# Patient Record
Sex: MALE | Race: WHITE | NOT HISPANIC OR LATINO | URBAN - METROPOLITAN AREA
[De-identification: names, ages, dates, MRNs, and addresses within clinical notes are randomized per-mention and may not be internally consistent; named-entity substitution may affect disease eponyms.]

---

## 2018-09-19 ENCOUNTER — INPATIENT (INPATIENT)
Facility: HOSPITAL | Age: 78
LOS: 0 days | Discharge: HOME | DRG: 871 | End: 2018-09-20
Attending: STUDENT IN AN ORGANIZED HEALTH CARE EDUCATION/TRAINING PROGRAM | Admitting: STUDENT IN AN ORGANIZED HEALTH CARE EDUCATION/TRAINING PROGRAM
Payer: MEDICARE

## 2018-09-19 VITALS
OXYGEN SATURATION: 93 % | RESPIRATION RATE: 18 BRPM | DIASTOLIC BLOOD PRESSURE: 78 MMHG | SYSTOLIC BLOOD PRESSURE: 120 MMHG | HEART RATE: 113 BPM | TEMPERATURE: 102 F | WEIGHT: 146.83 LBS | HEIGHT: 66 IN

## 2018-09-19 DIAGNOSIS — J18.9 PNEUMONIA, UNSPECIFIED ORGANISM: ICD-10-CM

## 2018-09-19 DIAGNOSIS — B20 HUMAN IMMUNODEFICIENCY VIRUS [HIV] DISEASE: ICD-10-CM

## 2018-09-19 DIAGNOSIS — R63.8 OTHER SYMPTOMS AND SIGNS CONCERNING FOOD AND FLUID INTAKE: ICD-10-CM

## 2018-09-19 DIAGNOSIS — A41.9 SEPSIS, UNSPECIFIED ORGANISM: ICD-10-CM

## 2018-09-19 DIAGNOSIS — Z91.89 OTHER SPECIFIED PERSONAL RISK FACTORS, NOT ELSEWHERE CLASSIFIED: ICD-10-CM

## 2018-09-19 DIAGNOSIS — N17.9 ACUTE KIDNEY FAILURE, UNSPECIFIED: ICD-10-CM

## 2018-09-19 DIAGNOSIS — E87.1 HYPO-OSMOLALITY AND HYPONATREMIA: ICD-10-CM

## 2018-09-19 DIAGNOSIS — F32.9 MAJOR DEPRESSIVE DISORDER, SINGLE EPISODE, UNSPECIFIED: ICD-10-CM

## 2018-09-19 LAB
ALBUMIN SERPL ELPH-MCNC: 4.3 G/DL — SIGNIFICANT CHANGE UP (ref 3.3–5)
ALP SERPL-CCNC: 73 U/L — SIGNIFICANT CHANGE UP (ref 40–120)
ALT FLD-CCNC: 20 U/L — SIGNIFICANT CHANGE UP (ref 10–45)
ANION GAP SERPL CALC-SCNC: 14 MMOL/L — SIGNIFICANT CHANGE UP (ref 5–17)
APPEARANCE UR: CLEAR — SIGNIFICANT CHANGE UP
APTT BLD: 29 SEC — SIGNIFICANT CHANGE UP (ref 27.5–37.4)
AST SERPL-CCNC: 27 U/L — SIGNIFICANT CHANGE UP (ref 10–40)
BASE EXCESS BLDV CALC-SCNC: 0.3 MMOL/L — SIGNIFICANT CHANGE UP
BASOPHILS NFR BLD AUTO: 0.2 % — SIGNIFICANT CHANGE UP (ref 0–2)
BILIRUB SERPL-MCNC: 0.8 MG/DL — SIGNIFICANT CHANGE UP (ref 0.2–1.2)
BILIRUB UR-MCNC: NEGATIVE — SIGNIFICANT CHANGE UP
BUN SERPL-MCNC: 20 MG/DL — SIGNIFICANT CHANGE UP (ref 7–23)
CALCIUM SERPL-MCNC: 9.9 MG/DL — SIGNIFICANT CHANGE UP (ref 8.4–10.5)
CHLORIDE SERPL-SCNC: 94 MMOL/L — LOW (ref 96–108)
CO2 SERPL-SCNC: 23 MMOL/L — SIGNIFICANT CHANGE UP (ref 22–31)
COLOR SPEC: YELLOW — SIGNIFICANT CHANGE UP
CREAT SERPL-MCNC: 1.74 MG/DL — HIGH (ref 0.5–1.3)
DIFF PNL FLD: ABNORMAL
GAS PNL BLDV: SIGNIFICANT CHANGE UP
GLUCOSE SERPL-MCNC: 143 MG/DL — HIGH (ref 70–99)
GLUCOSE UR QL: NEGATIVE — SIGNIFICANT CHANGE UP
HCO3 BLDV-SCNC: 25 MMOL/L — SIGNIFICANT CHANGE UP (ref 20–27)
HCT VFR BLD CALC: 46.3 % — SIGNIFICANT CHANGE UP (ref 39–50)
HGB BLD-MCNC: 15.6 G/DL — SIGNIFICANT CHANGE UP (ref 13–17)
INR BLD: 1.12 — SIGNIFICANT CHANGE UP (ref 0.88–1.16)
KETONES UR-MCNC: NEGATIVE — SIGNIFICANT CHANGE UP
LACTATE SERPL-SCNC: 1.1 MMOL/L — SIGNIFICANT CHANGE UP (ref 0.5–2)
LEUKOCYTE ESTERASE UR-ACNC: NEGATIVE — SIGNIFICANT CHANGE UP
LIDOCAIN IGE QN: 23 U/L — SIGNIFICANT CHANGE UP (ref 7–60)
LYMPHOCYTES # BLD AUTO: 16.8 % — SIGNIFICANT CHANGE UP (ref 13–44)
MCHC RBC-ENTMCNC: 33.2 PG — SIGNIFICANT CHANGE UP (ref 27–34)
MCHC RBC-ENTMCNC: 33.7 G/DL — SIGNIFICANT CHANGE UP (ref 32–36)
MCV RBC AUTO: 98.5 FL — SIGNIFICANT CHANGE UP (ref 80–100)
MONOCYTES NFR BLD AUTO: 7.1 % — SIGNIFICANT CHANGE UP (ref 2–14)
NEUTROPHILS NFR BLD AUTO: 75.9 % — SIGNIFICANT CHANGE UP (ref 43–77)
NITRITE UR-MCNC: NEGATIVE — SIGNIFICANT CHANGE UP
PCO2 BLDV: 39 MMHG — LOW (ref 41–51)
PH BLDV: 7.42 — SIGNIFICANT CHANGE UP (ref 7.32–7.43)
PH UR: 6 — SIGNIFICANT CHANGE UP (ref 5–8)
PLATELET # BLD AUTO: 204 K/UL — SIGNIFICANT CHANGE UP (ref 150–400)
PO2 BLDV: 26 MMHG — SIGNIFICANT CHANGE UP
POTASSIUM SERPL-MCNC: 4 MMOL/L — SIGNIFICANT CHANGE UP (ref 3.5–5.3)
POTASSIUM SERPL-SCNC: 4 MMOL/L — SIGNIFICANT CHANGE UP (ref 3.5–5.3)
PROT SERPL-MCNC: 8.5 G/DL — HIGH (ref 6–8.3)
PROT UR-MCNC: 30 MG/DL
PROTHROM AB SERPL-ACNC: 12.5 SEC — SIGNIFICANT CHANGE UP (ref 9.8–12.7)
RAPID RVP RESULT: SIGNIFICANT CHANGE UP
RBC # BLD: 4.7 M/UL — SIGNIFICANT CHANGE UP (ref 4.2–5.8)
RBC # FLD: 12.7 % — SIGNIFICANT CHANGE UP (ref 10.3–16.9)
SAO2 % BLDV: 52 % — SIGNIFICANT CHANGE UP
SODIUM SERPL-SCNC: 131 MMOL/L — LOW (ref 135–145)
SP GR SPEC: 1.01 — SIGNIFICANT CHANGE UP (ref 1–1.03)
UROBILINOGEN FLD QL: 0.2 E.U./DL — SIGNIFICANT CHANGE UP
WBC # BLD: 11.2 K/UL — HIGH (ref 3.8–10.5)
WBC # FLD AUTO: 11.2 K/UL — HIGH (ref 3.8–10.5)

## 2018-09-19 PROCEDURE — 70450 CT HEAD/BRAIN W/O DYE: CPT | Mod: 26

## 2018-09-19 PROCEDURE — 71046 X-RAY EXAM CHEST 2 VIEWS: CPT | Mod: 26

## 2018-09-19 PROCEDURE — 71045 X-RAY EXAM CHEST 1 VIEW: CPT | Mod: 26,59

## 2018-09-19 PROCEDURE — 99291 CRITICAL CARE FIRST HOUR: CPT

## 2018-09-19 PROCEDURE — 99223 1ST HOSP IP/OBS HIGH 75: CPT | Mod: GC

## 2018-09-19 RX ORDER — SODIUM CHLORIDE 9 MG/ML
1000 INJECTION INTRAMUSCULAR; INTRAVENOUS; SUBCUTANEOUS
Qty: 0 | Refills: 0 | Status: DISCONTINUED | OUTPATIENT
Start: 2018-09-19 | End: 2018-09-20

## 2018-09-19 RX ORDER — CEFTRIAXONE 500 MG/1
2 INJECTION, POWDER, FOR SOLUTION INTRAMUSCULAR; INTRAVENOUS EVERY 24 HOURS
Qty: 0 | Refills: 0 | Status: DISCONTINUED | OUTPATIENT
Start: 2018-09-20 | End: 2018-09-20

## 2018-09-19 RX ORDER — ACETAMINOPHEN 500 MG
975 TABLET ORAL ONCE
Qty: 0 | Refills: 0 | Status: COMPLETED | OUTPATIENT
Start: 2018-09-19 | End: 2018-09-19

## 2018-09-19 RX ORDER — SODIUM CHLORIDE 9 MG/ML
2000 INJECTION INTRAMUSCULAR; INTRAVENOUS; SUBCUTANEOUS ONCE
Qty: 0 | Refills: 0 | Status: COMPLETED | OUTPATIENT
Start: 2018-09-19 | End: 2018-09-19

## 2018-09-19 RX ORDER — AZITHROMYCIN 500 MG/1
250 TABLET, FILM COATED ORAL EVERY 24 HOURS
Qty: 0 | Refills: 0 | Status: DISCONTINUED | OUTPATIENT
Start: 2018-09-20 | End: 2018-09-20

## 2018-09-19 RX ORDER — AZITHROMYCIN 500 MG/1
500 TABLET, FILM COATED ORAL ONCE
Qty: 0 | Refills: 0 | Status: COMPLETED | OUTPATIENT
Start: 2018-09-19 | End: 2018-09-19

## 2018-09-19 RX ORDER — CEFTRIAXONE 500 MG/1
1 INJECTION, POWDER, FOR SOLUTION INTRAMUSCULAR; INTRAVENOUS ONCE
Qty: 0 | Refills: 0 | Status: COMPLETED | OUTPATIENT
Start: 2018-09-19 | End: 2018-09-19

## 2018-09-19 RX ADMIN — AZITHROMYCIN 255 MILLIGRAM(S): 500 TABLET, FILM COATED ORAL at 20:29

## 2018-09-19 RX ADMIN — Medication 975 MILLIGRAM(S): at 19:50

## 2018-09-19 RX ADMIN — SODIUM CHLORIDE 2000 MILLILITER(S): 9 INJECTION INTRAMUSCULAR; INTRAVENOUS; SUBCUTANEOUS at 19:48

## 2018-09-19 RX ADMIN — CEFTRIAXONE 100 GRAM(S): 500 INJECTION, POWDER, FOR SOLUTION INTRAMUSCULAR; INTRAVENOUS at 19:50

## 2018-09-19 RX ADMIN — SODIUM CHLORIDE 2000 MILLILITER(S): 9 INJECTION INTRAMUSCULAR; INTRAVENOUS; SUBCUTANEOUS at 20:23

## 2018-09-19 RX ADMIN — AZITHROMYCIN 500 MILLIGRAM(S): 500 TABLET, FILM COATED ORAL at 21:31

## 2018-09-19 RX ADMIN — CEFTRIAXONE 1 GRAM(S): 500 INJECTION, POWDER, FOR SOLUTION INTRAMUSCULAR; INTRAVENOUS at 20:23

## 2018-09-19 RX ADMIN — Medication 975 MILLIGRAM(S): at 20:23

## 2018-09-19 NOTE — ED ADULT NURSE REASSESSMENT NOTE - NS ED NURSE REASSESS COMMENT FT1
pt. and partner requesting d/c, spoke with Dr. Aragon, once again, who states that she will speak with them asap, understanding verbalized per pt. and partner, awaiting Dr. Aragon to speak with them, assessment on-going

## 2018-09-19 NOTE — ED ADULT NURSE NOTE - OBJECTIVE STATEMENT
c/ nausea, Cruz, off balance , fever today - denies other symptoms called MD and sent in for evaluation-- face symetric, no slurred speech, states felt confused this morning ; and feels off balance walking

## 2018-09-19 NOTE — ED ADULT NURSE REASSESSMENT NOTE - NS ED NURSE REASSESS COMMENT FT1
initial admit orders received, awaiting bed assignment, vss as noted, assessment on-going, awaiting further, family remains at bedside, both utd on poc, with understanding verbalized

## 2018-09-19 NOTE — ED ADULT NURSE NOTE - CHPI ED NUR SYMPTOMS NEG
no vomiting/no weakness/no nausea/no pain/no decreased eating/drinking/no chills/no tingling/no dizziness

## 2018-09-19 NOTE — H&P ADULT - FAMILY HISTORY
Father  Still living? Unknown  Family history of alcoholism, Age at diagnosis: Age Unknown Family history of alcoholism     Father  Still living? Unknown  Family history of suicide, Age at diagnosis: Age Unknown     Mother  Still living? Unknown  Family history of alcoholism, Age at diagnosis: Age Unknown

## 2018-09-19 NOTE — ED ADULT NURSE NOTE - NSIMPLEMENTINTERV_GEN_ALL_ED
Will mail appt slip and [employer] headache form to pt.   Implemented All Universal Safety Interventions:  [place] to call system. Call bell, personal items and telephone within reach. Instruct patient to call for assistance. Room bathroom lighting operational. Non-slip footwear when patient is off stretcher. Physically safe environment: no spills, clutter or unnecessary equipment. Stretcher in lowest position, wheels locked, appropriate side rails in place.

## 2018-09-19 NOTE — H&P ADULT - PROBLEM SELECTOR PLAN 1
-Mild leukocytosis with fever to 102, sinus tachycardia to 110s. lactate normal.  -Most likely 2/2 CAP. F/u blood and sputum cx. Mgmt of CAP as below (#2)  -UA inconsistent with UTI  -RVP negative  -CTH unremarkable. Oriented x 3 without meningeal signs

## 2018-09-19 NOTE — H&P ADULT - NSHPSOCIALHISTORY_GEN_ALL_CORE
tobacco-  etoh-  drugs-  lives with- partner  sex-  occupation- tobacco- denies  etoh- denies  drugs- denies  lives with- partner  sex- monogamous with one male partner. 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

## 2018-09-19 NOTE — ED PROVIDER NOTE - OBJECTIVE STATEMENT
78M with a h/o HIV on stribil, CD4 900s, VL undetectable, depression on paxil, who woke up with fever this morning, he later was walking a dog when he fell over on his side, no head trauma no loc, no neck or body pain, he spent the rest of the day in bed, not eating or drinking and later came to ER due to persistent malaise. partner states he is more sleepy and confused that normal. No recent travel, no sick contacts, no cp/sob, no cough, no abd pain, n/v, no urinary sx, no recent hospitalizations. no other complaints.

## 2018-09-19 NOTE — H&P ADULT - PROBLEM SELECTOR PLAN 3
-BUN 20, Cr 1.74. Unclear baseline  -Suspect pre-renal due to dehydration  -S/p 2L NS. C/w maintenance IVF for now at 100/hr  -f/u urine lytes  -trend BMP -BUN 20, Cr 1.74. Unclear baseline  -Suspect pre-renal due to dehydration  -S/p 2L NS. C/w maintenance IVF for now at 100/hr  -f/u urine lytes  -trend BMP and phos

## 2018-09-19 NOTE — H&P ADULT - PROBLEM SELECTOR PLAN 8
1) PCP Contacted on Admission: (Y/N) --> Name & Phone #:  2) Date of Contact with PCP:  3) PCP Contacted at Discharge: (Y/N)  4) Summary of Handoff Given to PCP:   5) Post-Discharge Appointment Date and Location: 1) PCP Contacted on Admission: (N) --> Name & Phone # Dr. Satnam Pedro  [phone]  3) PCP Contacted at Discharge: (Y/N)  4) Summary of Handoff Given to PCP:   5) Post-Discharge Appointment Date and Location:

## 2018-09-19 NOTE — H&P ADULT - PROBLEM SELECTOR PLAN 5
-C/w Stribild  -f/u CD4 and VL  -Reach out to outpt prescriber in am -C/w Kavin (partner bringing from home, due for dose this evening)  -f/u CD4 and VL  -Reach out to outpt prescriber in am

## 2018-09-19 NOTE — H&P ADULT - PROBLEM SELECTOR PLAN 4
-Na 131. Suspect due to dehydration. S/p 2L NS in ED. Now on maintenance IVF at 100/hr  -F/u urine lytes  -Trend BMP -Na 131. Suspect due to dehydration. S/p 2L NS in ED. Now on maintenance IVF at 100/hr  -F/u urine lytes  -Trend BMP  -consider TSH and legionella if no improvement

## 2018-09-19 NOTE — ED PROVIDER NOTE - CRITICAL CARE PROVIDED
direct patient care (not related to procedure)/interpretation of diagnostic studies/consultation with other physicians/consult w/ pt's family directly relating to pts condition/documentation/additional history taking

## 2018-09-19 NOTE — H&P ADULT - ASSESSMENT
78M with HIV and depression presenting with fevers, weakness and decreased PO intake admitted with CAP and JERRY

## 2018-09-19 NOTE — H&P ADULT - NSHPLABSRESULTS_GEN_ALL_CORE
15.6   11.2  )-----------( 204      ( 19 Sep 2018 19:52 )             46.3       LIVER FUNCTIONS - ( 19 Sep 2018 19:52 )  Alb: 4.3 g/dL / Pro: 8.5 g/dL / ALK PHOS: 73 U/L / ALT: 20 U/L / AST: 27 U/L / GGT: x             Urinalysis Basic - ( 19 Sep 2018 20:55 )    Color: Yellow / Appearance: Clear / S.010 / pH: x  Gluc: x / Ketone: NEGATIVE  / Bili: Negative / Urobili: 0.2 E.U./dL   Blood: x / Protein: 30 mg/dL / Nitrite: NEGATIVE   Leuk Esterase: NEGATIVE / RBC: 8-11 /HPF / WBC < 5 /HPF   Sq Epi: x / Non Sq Epi: x / Bacteria: Present /HPF    All imaging reviewed

## 2018-09-19 NOTE — H&P ADULT - PROBLEM SELECTOR PLAN 2
-Febrile and tachycardic. Normal WBC count. lactate normal.   -C/w rocephin and azithro  -F/u LDH. Low suspicion for PCP, TB or other OI based on CXR and history (adherent to ART) -Febrile and tachycardic. Normal WBC count. lactate normal.   -C/w rocephin and azithro  -LDH moderately elevated but nonspecific. Low suspicion for PCP, TB or other OI based on CXR and history (adherent to ART). f/u VL and CD4

## 2018-09-19 NOTE — ED ADULT NURSE REASSESSMENT NOTE - NS ED NURSE REASSESS COMMENT FT1
pt. and spouse state that they'd rather go home tonight than stay inpatient, Dr. Aragon notified, states she will come speak with pt. and family when she gets a chance

## 2018-09-19 NOTE — H&P ADULT - NSHPPHYSICALEXAM_GEN_ALL_CORE
General:  NAD, nontoxic appearing, WDWN  HENT:  EOMI, PERRL.  Mild L conjunctival injection. No purulence. No proptosis. No sinus tenderness.  oropharynx WNL.  MMM  Neck:  Trachea midline.  No JVD, LAD, or thyromegaly.  Heart:  S1S2 no M/R/G, rrr  Lungs:  CTAB no wheezing, rhonchi or rales.  No accessory muscle use.  No respiratory distress.  Abdomen:  NABS.  soft, nontender, nondistended.  no guarding.  no ascites.  no organomegaly.  Vascular:  Peripheral pulses palpable  Extremities:  No edema  Back:  No CVA tenderness  Neuro:  AOx3, no facial asymmetry, nonfocal, no slurred speech  Skin:  No rash

## 2018-09-19 NOTE — ED PROVIDER NOTE - PHYSICAL EXAMINATION
GEN: Febrile but nontoxic appearing, well nourished, awake, alert, oriented to person, place, time/situation and in no apparent distress.  ENT: Airway patent, Nasal mucosa clear. Mouth with dry mucosa.  EYES: Clear bilaterally. perrl, eomi  RESPIRATORY: Breathing comfortably with normal RR. No w/c/r.  CARDIAC: tachycardic, regular rhtyhm  ABDOMEN: Soft, nontender, +bowel sounds, no rebound, rigidity, or guarding.  MSK: Range of motion is not limited, no deformities noted. No meningismus.  NEURO: Alert and oriented x 3. Cn 2-12 intact. Strength 5/5 and sensation intact in all 4 extremities. no pronator drift. FTN normal.  SKIN: Skin normal color for race, warm, dry and intact. No evidence of rash.  PSYCH: Alert and oriented to person, place, time/situation. normal mood and affect. no apparent risk to self or others. GEN: Febrile but nontoxic appearing, well nourished, awake, alert, oriented to person, place, time/situation and in no apparent distress.  ENT: Airway patent, Nasal mucosa clear. Mouth with dry mucosa.  EYES: Clear bilaterally. perrl, eomi  RESPIRATORY: Breathing comfortably with normal RR. No w/c/r.  CARDIAC: tachycardic, regular rhythm  ABDOMEN: Soft, nontender, +bowel sounds, no rebound, rigidity, or guarding.  MSK: Range of motion is not limited, no deformities noted. No meningismus.  NEURO: Alert and oriented x 3. Cn 2-12 intact. Strength 5/5 and sensation intact in all 4 extremities. no pronator drift. FTN normal.  SKIN: Skin normal color for race, warm, dry and intact. No evidence of rash.  PSYCH: Alert and oriented to person, place, time/situation. normal mood and affect. no apparent risk to self or others.

## 2018-09-19 NOTE — H&P ADULT - PROBLEM SELECTOR PLAN 7
F: NS at 100/hr   E: replete cautiously in setting of JERRY  N: dysphagia screen. if passes, regular diet  GI ppx: none  DVT: HSQ  Code: full code  Dispo: SAM

## 2018-09-19 NOTE — H&P ADULT - ATTENDING COMMENTS
patient seen and examined    reviewed vs, labs, available radiological reports/ studies, ekg     agree w/ PE findings as above w/ additions/ exceptions/ pertinent findings:     1. Sepsis/ CAP   2. JERRY     rest of plan as above patient seen and examined; pt reported that his sxs improved since ED treatment     reviewed vs, labs, available radiological reports/ studies, ekg     agree w/ PE findings as above w/ exceptions/ additions/ pertinent findings:  pt w/ dry MM, no JVD, lungs CTA b/l     1. Sepsis/ CAP : on IVFs, ceftriaxone and azithromycin   2. JERRY : monitor renal function, on IVFs     rest of plan as above

## 2018-09-19 NOTE — ED ADULT NURSE NOTE - CHIEF COMPLAINT QUOTE
nausea, Cruz, off balance , fever today - denies other symptoms called MD and sent in for evaluation-- face symetric, no slurred speech, states felt confused this morning ; and feels off balance walking

## 2018-09-19 NOTE — H&P ADULT - HISTORY OF PRESENT ILLNESS
Pt is a 78M with a history of HIV (on Stribild, last CD 900s, VL UD) and depression who presents with       In the ED, T 102.2, , /78, RR 18, 93% on RA. Labs notable for WBC 11.2 (normal diff), Na 131, chloride 94, Cr 1.74. Lactate WNL. CE neg. UA inconsistent with UTI. RVP negative. CTH unremarkable. CXR showed small ZULY infiltrate. He received 2L NS, rocephin, azithro and tylenol. Pt is a 78M with a history of HIV (on Stribild, last CD 900s, VL UD), stage I prostate CA (planned for cryoablation in October) and depression who presents with fevers, lethargy and decreased PO intake x 1 day. He is accompanied by his partner who provides collateral information. He says that he was feeling well yesterday. He woke up this morning feeling warm. His temperature was 102 this morning. He felt slightly confused but went about his day. He reports a mild frontal HA and some nausea. No vomiting. Denies cough, SOB, CP, palpitations, abd pain, D/C, dysuria, hematuria, hematochezia, melena, rash, joint pain, recent travel, sick contacts. Throughout the day, he became progressively weaker and had two minor falls. No head injuries or LOC. He put on two L foot shoes this morning. His partner says that he had some mild slurred speech this morning that resolved. No focal weakness. Denies numbness/tingling, dysphagia, sensory changes, double vision, hearing loss. No neck stiffness. No hx PNA. He works for the dept of [employer] but denies direct contact with incarcerated. Denies hemoptysis, weight loss, night sweats.    His HIV is well-controlled. He was diagnosed in 1992. He is currently on Stribild. He follows with Dr. Satnam Pedro at [facility]. His last appt was in August at which time his CD4 was ~1200 and his VL was undetectable. He has no history of O/I. He says his HIV has always been well-controlled. He was previously on Truvada and multiple other medications in the remote past. He denies missing doses of his Stribild.     Otherwise ROS negative. In the ED, T 102.2, , /78, RR 18, 93% on RA. Labs notable for WBC 11.2 (normal diff), Na 131, chloride 94, Cr 1.74. Lactate WNL. CE neg. UA inconsistent with UTI. RVP negative. CTH unremarkable. CXR showed small ZULY infiltrate. He received 2L NS, rocephin, azithro and tylenol. Pt is a 78M with a history of HIV (on Stribild, last CD 900s, VL UD), stage I prostate CA (planned for cryoablation in October) and depression who presents with fevers, lethargy and decreased PO intake x 1 day. He is accompanied by his partner who provides collateral information. He says that he was feeling well yesterday. He woke up this morning feeling warm. His temperature was 102 this morning. He felt slightly confused but went about his day. He reports a mild frontal HA and some nausea. No vomiting. Denies cough, SOB, CP, palpitations, abd pain, D/C, dysuria, hematuria, hematochezia, melena, rash, joint pain, recent travel, sick contacts. Throughout the day, he became progressively weaker and had two minor falls. No head injuries or LOC. He put on two L foot shoes this morning. His partner says that he had some mild slurred speech this morning that resolved. No focal weakness. Denies numbness/tingling, dysphagia, sensory changes, double vision, hearing loss. No neck stiffness. No hx PNA. He works for the dept of [employer] but denies direct contact with incarcerated. Denies hemoptysis, weight loss, night sweats.    His HIV is well-controlled. He was diagnosed in 1992. He is currently on Stribild. He follows with Dr. Satnam Pedro at [facility] ([phone]). His last appt was in August at which time his CD4 was ~1200 and his VL was undetectable. He has no history of O/I. He says his HIV has always been well-controlled. He was previously on Truvada and multiple other medications in the remote past. He denies missing doses of his Stribild.     Otherwise ROS negative. In the ED, T 102.2, , /78, RR 18, 93% on RA. Labs notable for WBC 11.2 (normal diff), Na 131, chloride 94, Cr 1.74. Lactate WNL. CE neg. UA inconsistent with UTI. RVP negative. CTH unremarkable. CXR showed small ZULY infiltrate. He received 2L NS, rocephin, azithro and tylenol.

## 2018-09-19 NOTE — ED ADULT TRIAGE NOTE - CHIEF COMPLAINT QUOTE
nausea, Curz, off balance , fever today - denies other symptoms called MD and sent in for evaluation-- face symetric, no slurred speech, states felt confused this morning ; and feels off balance walking

## 2018-09-20 VITALS
RESPIRATION RATE: 16 BRPM | OXYGEN SATURATION: 95 % | DIASTOLIC BLOOD PRESSURE: 63 MMHG | HEART RATE: 80 BPM | SYSTOLIC BLOOD PRESSURE: 101 MMHG | TEMPERATURE: 98 F

## 2018-09-20 DIAGNOSIS — J18.9 PNEUMONIA, UNSPECIFIED ORGANISM: ICD-10-CM

## 2018-09-20 DIAGNOSIS — N17.9 ACUTE KIDNEY FAILURE, UNSPECIFIED: ICD-10-CM

## 2018-09-20 DIAGNOSIS — G92 TOXIC ENCEPHALOPATHY: ICD-10-CM

## 2018-09-20 DIAGNOSIS — Z87.81 PERSONAL HISTORY OF (HEALED) TRAUMATIC FRACTURE: Chronic | ICD-10-CM

## 2018-09-20 DIAGNOSIS — R94.6 ABNORMAL RESULTS OF THYROID FUNCTION STUDIES: ICD-10-CM

## 2018-09-20 LAB
BUN SERPL-MCNC: 16 MG/DL — SIGNIFICANT CHANGE UP (ref 7–23)
CALCIUM SERPL-MCNC: 9 MG/DL — SIGNIFICANT CHANGE UP (ref 8.4–10.5)
CHLORIDE SERPL-SCNC: 100 MMOL/L — SIGNIFICANT CHANGE UP (ref 96–108)
CO2 SERPL-SCNC: 26 MMOL/L — SIGNIFICANT CHANGE UP (ref 22–31)
CREAT ?TM UR-MCNC: 68 MG/DL — SIGNIFICANT CHANGE UP
CREAT SERPL-MCNC: 1.48 MG/DL — HIGH (ref 0.5–1.3)
EXTRA GREEN TOP TUBE: SIGNIFICANT CHANGE UP
GLUCOSE SERPL-MCNC: 114 MG/DL — HIGH (ref 70–99)
HCT VFR BLD CALC: 40.5 % — SIGNIFICANT CHANGE UP (ref 39–50)
HGB BLD-MCNC: 13.3 G/DL — SIGNIFICANT CHANGE UP (ref 13–17)
HIV-1 VIRAL LOAD RESULT: SIGNIFICANT CHANGE UP
HIV1 RNA # SERPL NAA+PROBE: SIGNIFICANT CHANGE UP
HIV1 RNA SER-IMP: SIGNIFICANT CHANGE UP
HIV1 RNA SERPL NAA+PROBE-ACNC: SIGNIFICANT CHANGE UP
HIV1 RNA SERPL NAA+PROBE-LOG#: SIGNIFICANT CHANGE UP LG COP/ML
LDH SERPL L TO P-CCNC: 326 U/L — HIGH (ref 50–242)
MAGNESIUM SERPL-MCNC: 2 MG/DL — SIGNIFICANT CHANGE UP (ref 1.6–2.6)
MCHC RBC-ENTMCNC: 32.8 G/DL — SIGNIFICANT CHANGE UP (ref 32–36)
MCHC RBC-ENTMCNC: 33.4 PG — SIGNIFICANT CHANGE UP (ref 27–34)
MCV RBC AUTO: 101.8 FL — HIGH (ref 80–100)
OSMOLALITY UR: 619 MOSMOL/KG — SIGNIFICANT CHANGE UP (ref 100–650)
PHOSPHATE SERPL-MCNC: 2.2 MG/DL — LOW (ref 2.5–4.5)
PHOSPHATE SERPL-MCNC: 2.3 MG/DL — LOW (ref 2.5–4.5)
PLATELET # BLD AUTO: 174 K/UL — SIGNIFICANT CHANGE UP (ref 150–400)
POTASSIUM SERPL-MCNC: 4.8 MMOL/L — SIGNIFICANT CHANGE UP (ref 3.5–5.3)
POTASSIUM SERPL-SCNC: 4.8 MMOL/L — SIGNIFICANT CHANGE UP (ref 3.5–5.3)
RBC # BLD: 3.98 M/UL — LOW (ref 4.2–5.8)
RBC # FLD: 13 % — SIGNIFICANT CHANGE UP (ref 10.3–16.9)
SODIUM SERPL-SCNC: 138 MMOL/L — SIGNIFICANT CHANGE UP (ref 135–145)
SODIUM UR-SCNC: 194 MMOL/L — SIGNIFICANT CHANGE UP
TSH SERPL-MCNC: 0.26 UIU/ML — LOW (ref 0.35–4.94)
UUN UR-MCNC: 493 MG/DL — SIGNIFICANT CHANGE UP
WBC # BLD: 7.9 K/UL — SIGNIFICANT CHANGE UP (ref 3.8–10.5)
WBC # FLD AUTO: 7.9 K/UL — SIGNIFICANT CHANGE UP (ref 3.8–10.5)

## 2018-09-20 PROCEDURE — 80048 BASIC METABOLIC PNL TOTAL CA: CPT

## 2018-09-20 PROCEDURE — G0378: CPT

## 2018-09-20 PROCEDURE — 83935 ASSAY OF URINE OSMOLALITY: CPT

## 2018-09-20 PROCEDURE — 84100 ASSAY OF PHOSPHORUS: CPT

## 2018-09-20 PROCEDURE — 36415 COLL VENOUS BLD VENIPUNCTURE: CPT

## 2018-09-20 PROCEDURE — 84540 ASSAY OF URINE/UREA-N: CPT

## 2018-09-20 PROCEDURE — 83615 LACTATE (LD) (LDH) ENZYME: CPT

## 2018-09-20 PROCEDURE — 90686 IIV4 VACC NO PRSV 0.5 ML IM: CPT

## 2018-09-20 PROCEDURE — 97161 PT EVAL LOW COMPLEX 20 MIN: CPT

## 2018-09-20 PROCEDURE — 96367 TX/PROPH/DG ADDL SEQ IV INF: CPT

## 2018-09-20 PROCEDURE — 87086 URINE CULTURE/COLONY COUNT: CPT

## 2018-09-20 PROCEDURE — 82550 ASSAY OF CK (CPK): CPT

## 2018-09-20 PROCEDURE — 83605 ASSAY OF LACTIC ACID: CPT

## 2018-09-20 PROCEDURE — 87581 M.PNEUMON DNA AMP PROBE: CPT

## 2018-09-20 PROCEDURE — 82803 BLOOD GASES ANY COMBINATION: CPT

## 2018-09-20 PROCEDURE — 81001 URINALYSIS AUTO W/SCOPE: CPT

## 2018-09-20 PROCEDURE — 84300 ASSAY OF URINE SODIUM: CPT

## 2018-09-20 PROCEDURE — 84484 ASSAY OF TROPONIN QUANT: CPT

## 2018-09-20 PROCEDURE — 85610 PROTHROMBIN TIME: CPT

## 2018-09-20 PROCEDURE — 87486 CHLMYD PNEUM DNA AMP PROBE: CPT

## 2018-09-20 PROCEDURE — 83690 ASSAY OF LIPASE: CPT

## 2018-09-20 PROCEDURE — 85025 COMPLETE CBC W/AUTO DIFF WBC: CPT

## 2018-09-20 PROCEDURE — 99238 HOSP IP/OBS DSCHRG MGMT 30/<: CPT

## 2018-09-20 PROCEDURE — 70450 CT HEAD/BRAIN W/O DYE: CPT

## 2018-09-20 PROCEDURE — 96365 THER/PROPH/DIAG IV INF INIT: CPT

## 2018-09-20 PROCEDURE — 85730 THROMBOPLASTIN TIME PARTIAL: CPT

## 2018-09-20 PROCEDURE — 87536 HIV-1 QUANT&REVRSE TRNSCRPJ: CPT

## 2018-09-20 PROCEDURE — 87798 DETECT AGENT NOS DNA AMP: CPT

## 2018-09-20 PROCEDURE — 83735 ASSAY OF MAGNESIUM: CPT

## 2018-09-20 PROCEDURE — 82570 ASSAY OF URINE CREATININE: CPT

## 2018-09-20 PROCEDURE — 85027 COMPLETE CBC AUTOMATED: CPT

## 2018-09-20 PROCEDURE — 87633 RESP VIRUS 12-25 TARGETS: CPT

## 2018-09-20 PROCEDURE — 86359 T CELLS TOTAL COUNT: CPT

## 2018-09-20 PROCEDURE — 99285 EMERGENCY DEPT VISIT HI MDM: CPT | Mod: 25

## 2018-09-20 PROCEDURE — 84443 ASSAY THYROID STIM HORMONE: CPT

## 2018-09-20 PROCEDURE — 82962 GLUCOSE BLOOD TEST: CPT

## 2018-09-20 PROCEDURE — 80053 COMPREHEN METABOLIC PANEL: CPT

## 2018-09-20 PROCEDURE — 87040 BLOOD CULTURE FOR BACTERIA: CPT

## 2018-09-20 PROCEDURE — 71045 X-RAY EXAM CHEST 1 VIEW: CPT

## 2018-09-20 PROCEDURE — 71046 X-RAY EXAM CHEST 2 VIEWS: CPT

## 2018-09-20 PROCEDURE — 82553 CREATINE MB FRACTION: CPT

## 2018-09-20 RX ORDER — AZITHROMYCIN 500 MG/1
250 TABLET, FILM COATED ORAL DAILY
Qty: 0 | Refills: 0 | Status: DISCONTINUED | OUTPATIENT
Start: 2018-09-20 | End: 2018-09-20

## 2018-09-20 RX ORDER — ELVITEGRAVIR, COBICISTAT, EMTRICITABINE, AND TENOFOVIR ALAFENAMIDE 150; 150; 200; 10 MG/1; MG/1; MG/1; MG/1
1 TABLET ORAL
Qty: 0 | Refills: 0 | COMMUNITY

## 2018-09-20 RX ORDER — CEFPODOXIME PROXETIL 100 MG
200 TABLET ORAL EVERY 12 HOURS
Qty: 0 | Refills: 0 | Status: DISCONTINUED | OUTPATIENT
Start: 2018-09-20 | End: 2018-09-20

## 2018-09-20 RX ORDER — CEFPODOXIME PROXETIL 100 MG
1 TABLET ORAL
Qty: 9 | Refills: 0 | OUTPATIENT
Start: 2018-09-20 | End: 2018-09-24

## 2018-09-20 RX ORDER — AZITHROMYCIN 500 MG/1
1 TABLET, FILM COATED ORAL
Qty: 4 | Refills: 0 | OUTPATIENT
Start: 2018-09-20 | End: 2018-09-23

## 2018-09-20 RX ORDER — INFLUENZA VIRUS VACCINE 15; 15; 15; 15 UG/.5ML; UG/.5ML; UG/.5ML; UG/.5ML
0.5 SUSPENSION INTRAMUSCULAR ONCE
Qty: 0 | Refills: 0 | Status: COMPLETED | OUTPATIENT
Start: 2018-09-20 | End: 2018-09-20

## 2018-09-20 RX ORDER — CEFPODOXIME PROXETIL 100 MG
1 TABLET ORAL
Qty: 10 | Refills: 0 | OUTPATIENT
Start: 2018-09-20 | End: 2018-09-24

## 2018-09-20 RX ORDER — BUPROPION HYDROCHLORIDE 150 MG/1
300 TABLET, EXTENDED RELEASE ORAL DAILY
Qty: 0 | Refills: 0 | Status: DISCONTINUED | OUTPATIENT
Start: 2018-09-20 | End: 2018-09-20

## 2018-09-20 RX ORDER — BUPROPION HYDROCHLORIDE 150 MG/1
300 TABLET, EXTENDED RELEASE ORAL
Qty: 0 | Refills: 0 | COMMUNITY

## 2018-09-20 RX ORDER — ELVITEGRAVIR, COBICISTAT, EMTRICITABINE, AND TENOFOVIR ALAFENAMIDE 150; 150; 200; 10 MG/1; MG/1; MG/1; MG/1
1 TABLET ORAL EVERY 24 HOURS
Qty: 0 | Refills: 0 | Status: DISCONTINUED | OUTPATIENT
Start: 2018-09-20 | End: 2018-09-20

## 2018-09-20 RX ORDER — AZITHROMYCIN 500 MG/1
1 TABLET, FILM COATED ORAL
Qty: 5 | Refills: 0 | OUTPATIENT
Start: 2018-09-20 | End: 2018-09-24

## 2018-09-20 RX ADMIN — Medication 25 MILLIGRAM(S): at 11:08

## 2018-09-20 RX ADMIN — SODIUM CHLORIDE 100 MILLILITER(S): 9 INJECTION INTRAMUSCULAR; INTRAVENOUS; SUBCUTANEOUS at 01:46

## 2018-09-20 RX ADMIN — Medication 1 DROP(S): at 00:24

## 2018-09-20 RX ADMIN — Medication 200 MILLIGRAM(S): at 12:40

## 2018-09-20 RX ADMIN — AZITHROMYCIN 250 MILLIGRAM(S): 500 TABLET, FILM COATED ORAL at 12:40

## 2018-09-20 RX ADMIN — INFLUENZA VIRUS VACCINE 0.5 MILLILITER(S): 15; 15; 15; 15 SUSPENSION INTRAMUSCULAR at 12:44

## 2018-09-20 RX ADMIN — ELVITEGRAVIR, COBICISTAT, EMTRICITABINE, AND TENOFOVIR ALAFENAMIDE 1 TABLET(S): 150; 150; 200; 10 TABLET ORAL at 06:47

## 2018-09-20 RX ADMIN — Medication 25 MILLIGRAM(S): at 00:31

## 2018-09-20 RX ADMIN — BUPROPION HYDROCHLORIDE 300 MILLIGRAM(S): 150 TABLET, EXTENDED RELEASE ORAL at 11:08

## 2018-09-20 RX ADMIN — Medication 1 DROP(S): at 06:57

## 2018-09-20 NOTE — PROGRESS NOTE ADULT - PROBLEM SELECTOR PLAN 2
likely pre-renal, d/t decreased PO intake in setting of sepsis; renal fxn normalizing w/ IVF; oral hydration encouraged; monitor BMP

## 2018-09-20 NOTE — DISCHARGE NOTE ADULT - PATIENT PORTAL LINK FT
You can access the [facility], offered by [facility], by registering with the following website: [email or web address]

## 2018-09-20 NOTE — PROGRESS NOTE ADULT - SUBJECTIVE AND OBJECTIVE BOX
Patient is a 78y old  Male who presents with a chief complaint of PNA (20 Sep 2018 10:57)      INTERVAL HPI/OVERNIGHT EVENTS:    Pt. seen and examined at 11AM  Pt. feels much better; fever resolved, reports increased energy and PO intake  Mentating at baseline  Denies chills, cough, CP, SOB, N/V/D, hemoptysis    Review of Systems: 12 point review of systems otherwise negative    MEDICATIONS  (STANDING):  azithromycin   Tablet 250 milliGRAM(s) Oral every 24 hours  buPROPion XL . 300 milliGRAM(s) Oral daily  cefpodoxime 200 milliGRAM(s) Oral every 12 hours  PARoxetine 25 milliGRAM(s) Oral daily  tenofovir disoproxil fumarate 300 mG/dipwfvameisa915 mG/cobicistat 150 mG/emtricitabine 200 mG (STRIBILD) 1 Tablet(s) Oral every 24 hours    MEDICATIONS  (PRN):  artificial  tears Solution 1 Drop(s) Both EYES every 3 hours PRN Dry Eyes      Allergies    No Known Allergies    Intolerances          Vital Signs Last 24 Hrs  T(C): 36.7 (20 Sep 2018 00:14), Max: 39 (19 Sep 2018 19:24)  T(F): 98.1 (20 Sep 2018 00:14), Max: 102.2 (19 Sep 2018 19:24)  HR: 80 (20 Sep 2018 00:14) (80 - 113)  BP: 101/63 (20 Sep 2018 00:14) (101/63 - 120/78)  BP(mean): --  RR: 16 (20 Sep 2018 00:14) (16 - 18)  SpO2: 95% (20 Sep 2018 00:14) (93% - 96%)  CAPILLARY BLOOD GLUCOSE      POCT Blood Glucose.: 153 mg/dL (19 Sep 2018 19:24)       @ 07:01  -   @ 07:00  --------------------------------------------------------  IN: 2400 mL / OUT: 300 mL / NET: 2100 mL        Physical Exam:  (at 11AM)  Daily Height in cm: 167.64 (19 Sep 2018 19:24)    Daily   General:  non-toxic and well-appearing in NAD  HEENT:  MMM  CV:  RRR, no JVD  Lungs:  CTA B/L, normal WOB on RA  Abdomen:  soft NT ND  Extremities:  no edema B/L LE  Skin:  WWP  Neuro:  AAOx3, non-focal    LABS:                        13.3   7.9   )-----------( 174      ( 20 Sep 2018 06:10 )             40.5     09-20    138  |  100  |  16  ----------------------------<  114<H>  4.8   |  26  |  1.48<H>    Ca    9.0      20 Sep 2018 06:10  Phos  2.2     -  Mg     2.0     -    TPro  8.5<H>  /  Alb  4.3  /  TBili  0.8  /  DBili  x   /  AST  27  /  ALT  20  /  AlkPhos  73  -    PT/INR - ( 19 Sep 2018 19:52 )   PT: 12.5 sec;   INR: 1.12          PTT - ( 19 Sep 2018 19:52 )  PTT:29.0 sec  Urinalysis Basic - ( 19 Sep 2018 20:55 )    Color: Yellow / Appearance: Clear / S.010 / pH: x  Gluc: x / Ketone: NEGATIVE  / Bili: Negative / Urobili: 0.2 E.U./dL   Blood: x / Protein: 30 mg/dL / Nitrite: NEGATIVE   Leuk Esterase: NEGATIVE / RBC: 8-11 /HPF / WBC < 5 /HPF   Sq Epi: x / Non Sq Epi: x / Bacteria: Present /HPF          RADIOLOGY & ADDITIONAL TESTS:    ---------------------------------------------------------------------------  I personally reviewed: [  ]EKG   [  ]CXR    [  ] CT    [  ]Other  ---------------------------------------------------------------------------  PLEASE CHECK WHEN PRESENT:     [  ]Heart Failure     [  ] Acute     [  ] Acute on Chronic     [  ] Chronic  -------------------------------------------------------------------     [  ]Diastolic [HFpEF]     [  ]Systolic [HFrEF]     [  ]Combined [HFpEF & HFrEF]     [  ]Other:  -------------------------------------------------------------------  [  ]JERRY     [  ]ATN     [  ]Reneal Medullary Necrosis     [  ]Renal Cortical Necrosis     [  ]Other Pathological Lesions:    [  ]CKD 1  [  ]CKD 2  [  ]CKD 3  [  ]CKD 4  [  ]CKD 5  [  ]Other  -------------------------------------------------------------------  [  ]Other/Unspecified:    --------------------------------------------------------------------    Abdominal Nutritional Status  [  ]Malnutrition: See Nutrition Note  [  ]Cachexia  [  ]Other:   [  ]Supplement Ordered:  [  ]Morbid Obesity (BMI >=40]

## 2018-09-20 NOTE — DISCHARGE NOTE ADULT - ADDITIONAL INSTRUCTIONS
Please follow up with Dr. Satnam Sharma at [facility] ([phone]) within 1-2 weeks. Please follow up with Dr. Satnam Sharma at [facility] ([phone]) within 1 week of discharge

## 2018-09-20 NOTE — PROGRESS NOTE ADULT - PROBLEM SELECTOR PLAN 1
POA, d/t ZULY CAP; clinically-improved on ceftriaxone + azithromycin (day #2/5-7, can change to PO), cont. supportive care, f/u cultures

## 2018-09-20 NOTE — PHYSICAL THERAPY INITIAL EVALUATION ADULT - ADDITIONAL COMMENTS
Pt lives in elevator building with partner, 2 lobby steps to enter. Pt reports indpt with all ADLs/iADLs, working part time, commuting to work with bus and subway. Reports walking multiple miles per day, no limitation. Uses 4 flights of stairs by choice in building (elevator). Denies AD use, denies help in home. Recent fall yesterday 2/2 fever and feeling of malaise in [facility] walking dog on [facility] [place].

## 2018-09-20 NOTE — DISCHARGE NOTE ADULT - PLAN OF CARE
To treat your infection You were diagnosed with sepsis due to pneumonia on this visit. Your chest xray showed a pneumonia. You were treated with antibiotics and began to show improvement. Please continue to take your antibiotics as prescribed. Please follow up with your PMD to check for full resolution of this problem. You have a history of HIV. Please continue to take your home medications and continue to follow up with your PMD. You came in with low sodium levels. This was corrected with fluids. Please continue to consume a healthy diet. You came in with acute kidney injury likely due to dehydration. Your kidney function improved with IV fluids. Please follow up with your PMD. Please continue to take your home medication. Your thyroid stimulating hormone was low but you have no symptoms of hyperthyroidism. Please follow up with your PMD and have this level rechecked in about 4 weeks.

## 2018-09-20 NOTE — DISCHARGE NOTE ADULT - CARE PLAN
Principal Discharge DX:	Sepsis due to pneumonia  Goal:	To treat your infection  Assessment and plan of treatment:	You were diagnosed with sepsis due to pneumonia on this visit. Your chest xray showed a pneumonia. You were treated with antibiotics and began to show improvement. Please continue to take your antibiotics as prescribed. Please follow up with your PMD to check for full resolution of this problem.  Secondary Diagnosis:	HIV (human immunodeficiency virus infection)  Assessment and plan of treatment:	You have a history of HIV. Please continue to take your home medications and continue to follow up with your PMD.  Secondary Diagnosis:	Hyponatremia  Assessment and plan of treatment:	You came in with low sodium levels. This was corrected with fluids. Please continue to consume a healthy diet.  Secondary Diagnosis:	Depression, unspecified depression type  Assessment and plan of treatment:	You came in with acute kidney injury likely due to dehydration. Your kidney function improved with IV fluids. Please follow up with your PMD.  Secondary Diagnosis:	Thyroid function test abnormal  Assessment and plan of treatment:	Please continue to take your home medication.  Secondary Diagnosis:	CAP (community acquired pneumonia)  Assessment and plan of treatment:	Your thyroid stimulating hormone was low but you have no symptoms of hyperthyroidism. Please follow up with your PMD and have this level rechecked in about 4 weeks.  Secondary Diagnosis:	Acute kidney injury  Assessment and plan of treatment:	You came in with acute kidney injury likely due to dehydration. Your kidney function improved with IV fluids. Please follow up with your PMD.

## 2018-09-20 NOTE — DISCHARGE NOTE ADULT - SECONDARY DIAGNOSIS.
HIV (human immunodeficiency virus infection) Hyponatremia Depression, unspecified depression type Thyroid function test abnormal CAP (community acquired pneumonia) Acute kidney injury

## 2018-09-20 NOTE — DISCHARGE NOTE ADULT - MEDICATION SUMMARY - MEDICATIONS TO TAKE
I will START or STAY ON the medications listed below when I get home from the hospital:    Paxil  -- 25 milligram(s) by mouth once a day  -- Indication: For Depression, unspecified depression type    Stribild oral tablet  -- 1 tab(s) by mouth once a day  -- Indication: For HIV (human immunodeficiency virus infection)    Wellbutrin SR  -- 300 milligram(s) by mouth once a day  -- Indication: For Depression, unspecified depression type I will START or STAY ON the medications listed below when I get home from the hospital:    Paxil  -- 25 milligram(s) by mouth once a day  -- Indication: For Depression, unspecified depression type    Stribild oral tablet  -- 1 tab(s) by mouth once a day  -- Indication: For HIV (human immunodeficiency virus infection)    cefpodoxime 200 mg oral tablet  -- 1 tab(s) by mouth every 12 hours  -- Indication: For CAP (community acquired pneumonia)    Azithromycin 5 Day Dose Pack 250 mg oral tablet  -- 1 tab(s) by mouth every 24 hours  -- Indication: For CAP (community acquired pneumonia)    Wellbutrin SR  -- 300 milligram(s) by mouth once a day  -- Indication: For Depression, unspecified depression type

## 2018-09-20 NOTE — PHYSICAL THERAPY INITIAL EVALUATION ADULT - GENERAL OBSERVATIONS, REHAB EVAL
Pt admitted 2/2 fever, decreased PO, lethargy, s/p fall. Pt rcvd on ED stretcher, supine, pleasant A&Ox4, +L IVF, +R heplock. Pt reports feelings better, toelrated session well. Pt demo indpt bed mob, transfers, amb, stairs. Pt cleared from PT and d/c from PT program at this time. Pt left as found in NAD, all needs in reach. FIM gait=7, FIM stairs=7.

## 2018-09-20 NOTE — DISCHARGE NOTE ADULT - HOSPITAL COURSE
Pt is a 78M with a history of HIV (on Stribild, last CD 900s, VL UD), stage I prostate CA (planned for cryoablation in October) and depression who presents with fevers, lethargy and decreased PO intake x 1 day. Associated symptoms included mild frontal HA and some nausea. No vomiting. Denies cough, SOB, CP, palpitations, abd pain, D/C, dysuria, hematuria, hematochezia, melena, rash, joint pain, recent travel, sick contacts. No neck stiffness. No hx PNA. He works for the [employer] but denies direct contact with incarcerated. Denies hemoptysis, weight loss, night sweats. In ED, patient was febrile to 102.2, and was tachycardic to 113. Lactate was normal. Patient also had JERRY likely secondary to dehydration, hyponatremia, and low TSH. CXR showed ZULY infiltrate and patient was given IVF bolus 30cc/kg and was started on rocephin, azythromycin IV, and tylenol for sepsis secondary to CAP. Patient clinically improved this am, hyponatremia resolved and JERRY improved. Blood cultures were negative. Patient was transitioned to PO abx and is now stable to be discharged.

## 2018-09-20 NOTE — DISCHARGE NOTE ADULT - PROVIDER TOKENS
FREE:[LAST:[Zachary],FIRST:[Goryd],PHONE:[phone]],FAX:[(   )    -],ADDRESS:[[facility] [address] [place] [address]]]

## 2018-09-21 LAB
4/8 RATIO: 1.3 RATIO — SIGNIFICANT CHANGE UP (ref 0.86–4.14)
ABS CD8: 306 /UL — SIGNIFICANT CHANGE UP (ref 90–775)
ANION GAP SERPL CALC-SCNC: 12 MMOL/L — SIGNIFICANT CHANGE UP (ref 5–17)
CD3 BLASTS SPEC-ACNC: 58 % — SIGNIFICANT CHANGE UP (ref 58–84)
CD3 BLASTS SPEC-ACNC: 712 /UL — SIGNIFICANT CHANGE UP (ref 396–2024)
CD4 %: 33 % — SIGNIFICANT CHANGE UP (ref 30–56)
CD8 %: 25 % — SIGNIFICANT CHANGE UP (ref 11–43)
CULTURE RESULTS: NO GROWTH — SIGNIFICANT CHANGE UP
SPECIMEN SOURCE: SIGNIFICANT CHANGE UP
T-CELL CD4 SUBSET PNL BLD: 399 /UL — SIGNIFICANT CHANGE UP (ref 325–1251)

## 2018-09-23 DIAGNOSIS — J18.9 PNEUMONIA, UNSPECIFIED ORGANISM: ICD-10-CM

## 2018-09-23 DIAGNOSIS — F32.9 MAJOR DEPRESSIVE DISORDER, SINGLE EPISODE, UNSPECIFIED: ICD-10-CM

## 2018-09-23 DIAGNOSIS — R94.6 ABNORMAL RESULTS OF THYROID FUNCTION STUDIES: ICD-10-CM

## 2018-09-23 DIAGNOSIS — E86.0 DEHYDRATION: ICD-10-CM

## 2018-09-23 DIAGNOSIS — Z21 ASYMPTOMATIC HUMAN IMMUNODEFICIENCY VIRUS [HIV] INFECTION STATUS: ICD-10-CM

## 2018-09-23 DIAGNOSIS — E87.1 HYPO-OSMOLALITY AND HYPONATREMIA: ICD-10-CM

## 2018-09-23 DIAGNOSIS — G92 TOXIC ENCEPHALOPATHY: ICD-10-CM

## 2018-09-23 DIAGNOSIS — C61 MALIGNANT NEOPLASM OF PROSTATE: ICD-10-CM

## 2018-09-23 DIAGNOSIS — A41.9 SEPSIS, UNSPECIFIED ORGANISM: ICD-10-CM

## 2018-09-23 DIAGNOSIS — N17.9 ACUTE KIDNEY FAILURE, UNSPECIFIED: ICD-10-CM

## 2018-09-24 LAB
CULTURE RESULTS: SIGNIFICANT CHANGE UP
CULTURE RESULTS: SIGNIFICANT CHANGE UP
SPECIMEN SOURCE: SIGNIFICANT CHANGE UP
SPECIMEN SOURCE: SIGNIFICANT CHANGE UP

## 2018-12-05 NOTE — DISCHARGE NOTE ADULT - NS AS DC PROVIDER CONTACT Y/N MULTI
Progress Notes by Jonah Bean MD at 03/15/18 11:09 PM     Author:  Jonah Bean MD Service:  (none) Author Type:  Physician     Filed:  03/15/18 11:09 PM Encounter Date:  3/14/2018 Status:  Signed     :  Jonah Bean MD (Physician)            Apt 3/20.[WD1.1M]    Revision History        User Key Date/Time User Provider Type Action    > WD1.1 03/15/18 11:09 PM Jonah Bean MD Physician Sign    M - Manual            
Yes

## 2022-06-21 NOTE — ED PROVIDER NOTE - INPATIENT RESIDENT/ACP NOTIFIED DATE
19-Sep-2018 23:06 Eye Protection Verbiage: Before proceeding with the stage, a plastic scleral shield was inserted. The globe was anesthetized with a few drops of 1% lidocaine with 1:100,000 epinephrine. Then, an appropriate sized scleral shield was chosen and coated with lacrilube ointment. The shield was gently inserted and left in place for the duration of each stage. After the stage was completed, the shield was gently removed.

## 2022-09-12 NOTE — ED ADULT NURSE NOTE - NS ED NURSE ED ASSMT LEARNING IMPAIRMENTS
Goals: Food log (ie ) www myfitnesspal com,sparkpeople  com,loseit com,calorieking  com,etc  baritastic  No sugary beverages  At least 64oz of water daily  Increase physical activity by 10 minutes daily   Gradually increase physical activity to a goal of 5 days per week for 30 minutes of MODERATE intensity PLUS 2 days per week of FULL BODY resistance training  5-10 servings of fruits and vegetables per day and 25-35 grams of dietary fiber per day, gradually increasing
None

## 2023-09-15 NOTE — PHYSICAL THERAPY INITIAL EVALUATION ADULT - STANDING BALANCE: STATIC
normal balance
24M PMHx ESRD 2/2 FSGS on HD MWF, HTN, gout and schizophrenia, and frequently missing HD resulting in frequent hospitalization, most recently earlier this month.   Pt presenting after missing dialysis because he was "sleeping", w/ SOB.

## 2024-04-05 NOTE — PHYSICAL THERAPY INITIAL EVALUATION ADULT - PERTINENT HX OF CURRENT PROBLEM, REHAB EVAL
Rx sent.    78M with HIV and depression presenting with fevers, weakness and decreased PO intake admitted with CAP and JERRY